# Patient Record
Sex: MALE | URBAN - METROPOLITAN AREA
[De-identification: names, ages, dates, MRNs, and addresses within clinical notes are randomized per-mention and may not be internally consistent; named-entity substitution may affect disease eponyms.]

---

## 2021-07-07 ENCOUNTER — OFFICE VISIT (OUTPATIENT)
Dept: URBAN - METROPOLITAN AREA CLINIC 88 | Facility: CLINIC | Age: 61
End: 2021-07-07
Payer: COMMERCIAL

## 2021-07-07 DIAGNOSIS — H43.393 OTHER VITREOUS OPACITIES, BILATERAL: ICD-10-CM

## 2021-07-07 DIAGNOSIS — H35.3110 NONEXUDATIVE AGE-RELATED MACULAR DEGENERATION OF RIGHT EYE: ICD-10-CM

## 2021-07-07 DIAGNOSIS — H25.13 AGE-RELATED NUCLEAR CATARACT, BILATERAL: ICD-10-CM

## 2021-07-07 PROCEDURE — 99204 OFFICE O/P NEW MOD 45 MIN: CPT | Performed by: OPTOMETRIST

## 2021-07-07 RX ORDER — DOXYCYCLINE 100 MG/1
100 MG TABLET, FILM COATED ORAL
Qty: 100 | Refills: 0 | Status: INACTIVE
Start: 2021-07-07 | End: 2022-01-14

## 2021-07-07 ASSESSMENT — KERATOMETRY
OS: 41.88
OD: 42.00

## 2021-07-07 ASSESSMENT — VISUAL ACUITY
OS: 20/30
OD: 20/40

## 2021-07-07 ASSESSMENT — INTRAOCULAR PRESSURE
OS: 13
OD: 14

## 2021-07-07 NOTE — IMPRESSION/PLAN
Impression: Rosacea conjunctivitis, bilateral: W70.526. Plan: Discussed status with patient. Recommend patient start Doxycycline 100mg BID PO X 1 week, then QD PO for remainder of pills for a total of 90 days. sent [facility] today. Side effects discussed. Recommend Systane or Refresh QID OU, if no improvement in symptoms start Systane gel TID to QID OU. ED on importance of continuous treatment of the chronic condition to prevent corneal scarring/ulceration in future.

## 2021-07-07 NOTE — IMPRESSION/PLAN
Impression: Nonexudative age-related macular degeneration of right eye: H35.3110. Plan: Discussed condition in detail with patient. Patient understands that a healthy diet and UV protection with polycarbonate lenses can reduce the progression of dry macular degeneration. Recommended AREDS 2 formula eye vitamins. Patient knows to return to clinic immediately if any changes in vision are experienced.

## 2021-07-07 NOTE — IMPRESSION/PLAN
Impression: Dry eye syndrome of bilateral lacrimal glands: H04.123.  Plan: See above plan for Rosacea

## 2021-10-06 ENCOUNTER — OFFICE VISIT (OUTPATIENT)
Dept: URBAN - METROPOLITAN AREA CLINIC 88 | Facility: CLINIC | Age: 61
End: 2021-10-06
Payer: COMMERCIAL

## 2021-10-06 DIAGNOSIS — H04.123 DRY EYE SYNDROME OF BILATERAL LACRIMAL GLANDS: ICD-10-CM

## 2021-10-06 DIAGNOSIS — B00.59 OTHER HERPESVIRAL DISEASE OF EYE: Primary | ICD-10-CM

## 2021-10-06 DIAGNOSIS — H10.823 ROSACEA CONJUNCTIVITIS, BILATERAL: ICD-10-CM

## 2021-10-06 PROCEDURE — 99214 OFFICE O/P EST MOD 30 MIN: CPT | Performed by: OPTOMETRIST

## 2021-10-06 RX ORDER — GANCICLOVIR 1.5 MG/G
0.15 % GEL OPHTHALMIC
Qty: 5 | Refills: 0 | Status: ACTIVE
Start: 2021-10-06

## 2021-10-06 ASSESSMENT — INTRAOCULAR PRESSURE
OS: 14
OD: 14

## 2021-10-06 NOTE — IMPRESSION/PLAN
Impression: Rosacea conjunctivitis, bilateral: V20.520. Plan: Discussed status with patient. Continue Doxycycline 100mg QD PO for remainder of pills, then d/c for 3 months. Continue Systane or Refresh QID OU, if no improvement in symptoms, start Systane gel TID to QID OU.

## 2021-10-06 NOTE — IMPRESSION/PLAN
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 Plan: Discussed status with patient. Small corneal herpetic ulcer OS today. Start Zirgan drops 5X a day until ulcer heals, then TID OS X 1 week after. (sent [facility] today) cost and side effects discussed. Follow up in 1 week with cornea check.

## 2021-10-13 ENCOUNTER — OFFICE VISIT (OUTPATIENT)
Dept: URBAN - METROPOLITAN AREA CLINIC 88 | Facility: CLINIC | Age: 61
End: 2021-10-13
Payer: COMMERCIAL

## 2021-10-13 DIAGNOSIS — H52.4 PRESBYOPIA: ICD-10-CM

## 2021-10-13 PROCEDURE — 99213 OFFICE O/P EST LOW 20 MIN: CPT | Performed by: OPTOMETRIST

## 2021-10-13 NOTE — IMPRESSION/PLAN
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 Plan: Discussed status with patient. Small corneal herpetic ulcer OS resolved today. Reduce Zirgan drops to 3X a day x 1 week, then d/c. Once completed Zirgan, use Systane gel TID in its place indefinitely. RTC immediately if increase in pain, redness or decrease in vision occurs.

## 2021-10-13 NOTE — IMPRESSION/PLAN
Impression: Rosacea conjunctivitis, bilateral: R04.875. Plan: Discussed status with patient. No Doxycycline at this time for 3 months. Continue Systane gel TID OU. Will start doxy again at next visit if needed.

## 2022-01-14 ENCOUNTER — OFFICE VISIT (OUTPATIENT)
Dept: URBAN - METROPOLITAN AREA CLINIC 88 | Facility: CLINIC | Age: 62
End: 2022-01-14
Payer: COMMERCIAL

## 2022-01-14 DIAGNOSIS — H52.221 REGULAR ASTIGMATISM, RIGHT EYE: ICD-10-CM

## 2022-01-14 PROCEDURE — 99213 OFFICE O/P EST LOW 20 MIN: CPT | Performed by: OPTOMETRIST

## 2022-01-14 RX ORDER — DOXYCYCLINE 100 MG/1
100 MG TABLET, FILM COATED ORAL
Qty: 100 | Refills: 0 | Status: ACTIVE
Start: 2022-01-14

## 2022-01-14 ASSESSMENT — VISUAL ACUITY
OD: 20/25
OS: 20/25

## 2022-01-14 ASSESSMENT — INTRAOCULAR PRESSURE
OD: 16
OS: 16

## 2022-01-14 NOTE — IMPRESSION/PLAN
Impression: Rosacea conjunctivitis, bilateral: D00.411. Plan: Discussed status with patient. Recommend patient restart Doxycycline 100mg BID PO X 1 week, then QD PO for remainder of pills for a total of 90 days. sent [facility] today. Side effects discussed. Recommend Systane or Refresh QID OU, if no improvement in symptoms start Systane gel TID to QID OU. ED on importance of continuous treatment of the chronic condition to prevent corneal scarring/ulceration in future.

## 2022-01-14 NOTE — IMPRESSION/PLAN
Impression: Regular astigmatism, right eye: H52.221. Plan: Reviewed refractive prescription in detail with patient and no need for glasses to improve distance vision at this time.  Ed pt the majority of his blur is from punctate keratitis/dry eye

## 2022-07-13 ENCOUNTER — OFFICE VISIT (OUTPATIENT)
Dept: URBAN - METROPOLITAN AREA CLINIC 88 | Facility: CLINIC | Age: 62
End: 2022-07-13
Payer: COMMERCIAL

## 2022-07-13 DIAGNOSIS — H10.823 ROSACEA CONJUNCTIVITIS, BILATERAL: Primary | ICD-10-CM

## 2022-07-13 DIAGNOSIS — H25.13 AGE-RELATED NUCLEAR CATARACT, BILATERAL: ICD-10-CM

## 2022-07-13 DIAGNOSIS — H43.393 OTHER VITREOUS OPACITIES, BILATERAL: ICD-10-CM

## 2022-07-13 DIAGNOSIS — H35.3110 NONEXUDATIVE AGE-RELATED MACULAR DEGENERATION OF RIGHT EYE: ICD-10-CM

## 2022-07-13 DIAGNOSIS — H04.123 DRY EYE SYNDROME OF BILATERAL LACRIMAL GLANDS: ICD-10-CM

## 2022-07-13 PROCEDURE — 99213 OFFICE O/P EST LOW 20 MIN: CPT | Performed by: OPTOMETRIST

## 2022-07-13 RX ORDER — DOXYCYCLINE 100 MG/1
100 MG TABLET, FILM COATED ORAL
Qty: 100 | Refills: 0 | Status: ACTIVE
Start: 2022-07-13

## 2022-07-13 ASSESSMENT — INTRAOCULAR PRESSURE
OS: 15
OD: 14

## 2022-07-13 NOTE — IMPRESSION/PLAN
Impression: Rosacea conjunctivitis, bilateral: T03.346. Plan: Discussed status with patient. Recommend preservative free Refresh plus QID OU and Continue Systane gel TID to QID OU. ED on importance of continuous treatment of the chronic condition to prevent corneal scarring/ulceration in future. Will refill 3 month supply of Doxycyline. (sent electronically today). Pt may wait to take doxy up to a month before starting next 3 month course.

## 2023-01-19 ENCOUNTER — OFFICE VISIT (OUTPATIENT)
Dept: URBAN - METROPOLITAN AREA CLINIC 88 | Facility: CLINIC | Age: 63
End: 2023-01-19
Payer: COMMERCIAL

## 2023-01-19 DIAGNOSIS — H10.823 ROSACEA CONJUNCTIVITIS, BILATERAL: Primary | ICD-10-CM

## 2023-01-19 DIAGNOSIS — H04.123 DRY EYE SYNDROME OF BILATERAL LACRIMAL GLANDS: ICD-10-CM

## 2023-01-19 DIAGNOSIS — H25.13 AGE-RELATED NUCLEAR CATARACT, BILATERAL: ICD-10-CM

## 2023-01-19 PROCEDURE — 99213 OFFICE O/P EST LOW 20 MIN: CPT | Performed by: OPTOMETRIST

## 2023-01-19 RX ORDER — VARENICLINE 0.03 MG/.05ML
SPRAY NASAL
Qty: 8.4 | Refills: 11 | Status: ACTIVE
Start: 2023-01-19

## 2023-01-19 NOTE — IMPRESSION/PLAN
Impression: Rosacea conjunctivitis, bilateral: F25.911. Plan: Discussed status with patient. Continue preservative free Refresh plus QID OU and Continue Systane gel TID to QID OU. ED on importance of continuous treatment of the chronic condition to prevent corneal scarring/ulceration in future. Sample of Tryvaya given today, and RX sent electronically. Continue Doxycyline. Recommend pt to also use warm compress QD-BID OU X 5-10 minutes.

## 2023-09-06 ENCOUNTER — OFFICE VISIT (OUTPATIENT)
Dept: URBAN - METROPOLITAN AREA CLINIC 88 | Facility: CLINIC | Age: 63
End: 2023-09-06
Payer: COMMERCIAL

## 2023-09-06 DIAGNOSIS — H43.393 OTHER VITREOUS OPACITIES, BILATERAL: ICD-10-CM

## 2023-09-06 DIAGNOSIS — H25.813 COMBINED FORMS OF AGE-RELATED CATARACT, BILATERAL: ICD-10-CM

## 2023-09-06 DIAGNOSIS — H04.123 DRY EYE SYNDROME OF BILATERAL LACRIMAL GLANDS: ICD-10-CM

## 2023-09-06 DIAGNOSIS — H10.823 ROSACEA CONJUNCTIVITIS, BILATERAL: Primary | ICD-10-CM

## 2023-09-06 PROCEDURE — 99213 OFFICE O/P EST LOW 20 MIN: CPT | Performed by: OPTOMETRIST

## 2023-09-06 ASSESSMENT — INTRAOCULAR PRESSURE
OS: 14
OD: 13

## 2024-09-11 ENCOUNTER — OFFICE VISIT (OUTPATIENT)
Dept: URBAN - METROPOLITAN AREA CLINIC 88 | Facility: CLINIC | Age: 64
End: 2024-09-11
Payer: COMMERCIAL

## 2024-09-11 DIAGNOSIS — H10.823 ROSACEA CONJUNCTIVITIS, BILATERAL: ICD-10-CM

## 2024-09-11 DIAGNOSIS — H25.813 COMBINED FORMS OF AGE-RELATED CATARACT, BILATERAL: Primary | ICD-10-CM

## 2024-09-11 DIAGNOSIS — H43.393 OTHER VITREOUS OPACITIES, BILATERAL: ICD-10-CM

## 2024-09-11 DIAGNOSIS — H04.123 DRY EYE SYNDROME OF BILATERAL LACRIMAL GLANDS: ICD-10-CM

## 2024-09-11 PROCEDURE — 92134 CPTRZ OPH DX IMG PST SGM RTA: CPT | Performed by: OPTOMETRIST

## 2024-09-11 PROCEDURE — 99214 OFFICE O/P EST MOD 30 MIN: CPT | Performed by: OPTOMETRIST

## 2024-09-11 ASSESSMENT — INTRAOCULAR PRESSURE
OD: 13
OS: 15

## 2024-09-11 ASSESSMENT — VISUAL ACUITY
OS: 20/70
OD: 20/40

## 2024-10-11 ENCOUNTER — TECH ONLY (OUTPATIENT)
Dept: URBAN - METROPOLITAN AREA CLINIC 88 | Facility: CLINIC | Age: 64
End: 2024-10-11
Payer: COMMERCIAL

## 2024-10-11 DIAGNOSIS — H25.813 COMBINED FORMS OF AGE-RELATED CATARACT, BILATERAL: Primary | ICD-10-CM

## 2024-10-21 ENCOUNTER — SURGERY (OUTPATIENT)
Dept: URBAN - METROPOLITAN AREA SURGERY 56 | Facility: SURGERY | Age: 64
End: 2024-10-21
Payer: COMMERCIAL

## 2024-10-21 PROCEDURE — 66984 XCAPSL CTRC RMVL W/O ECP: CPT | Performed by: OPHTHALMOLOGY

## 2024-10-21 PROCEDURE — PR3CP PR3CP: CUSTOM | Performed by: OPHTHALMOLOGY

## 2024-10-22 ENCOUNTER — SURGERY (OUTPATIENT)
Dept: URBAN - METROPOLITAN AREA SURGERY 56 | Facility: SURGERY | Age: 64
End: 2024-10-22
Payer: COMMERCIAL

## 2024-10-23 ENCOUNTER — POST-OPERATIVE VISIT (OUTPATIENT)
Dept: URBAN - METROPOLITAN AREA CLINIC 88 | Facility: CLINIC | Age: 64
End: 2024-10-23
Payer: COMMERCIAL

## 2024-10-23 DIAGNOSIS — Z48.810 ENCOUNTER FOR SURGICAL AFTERCARE FOLLOWING SURGERY ON A SENSE ORGAN: Primary | ICD-10-CM

## 2024-10-23 RX ORDER — PREDNISOLONE ACETATE 10 MG/ML
1 % SUSPENSION/ DROPS OPHTHALMIC
Qty: 10 | Refills: 1 | Status: ACTIVE
Start: 2024-10-23

## 2024-10-23 ASSESSMENT — INTRAOCULAR PRESSURE: OS: 11

## 2024-10-29 ENCOUNTER — SURGERY (OUTPATIENT)
Dept: URBAN - METROPOLITAN AREA SURGERY 56 | Facility: SURGERY | Age: 64
End: 2024-10-29
Payer: COMMERCIAL

## 2024-10-29 PROCEDURE — PR3CP PR3CP: CUSTOM | Performed by: OPHTHALMOLOGY

## 2024-10-29 PROCEDURE — 66984 XCAPSL CTRC RMVL W/O ECP: CPT | Performed by: OPHTHALMOLOGY

## 2024-10-30 ENCOUNTER — POST-OPERATIVE VISIT (OUTPATIENT)
Dept: URBAN - METROPOLITAN AREA CLINIC 88 | Facility: CLINIC | Age: 64
End: 2024-10-30
Payer: COMMERCIAL

## 2024-10-30 DIAGNOSIS — Z96.1 PRESENCE OF INTRAOCULAR LENS: Primary | ICD-10-CM

## 2024-10-30 ASSESSMENT — INTRAOCULAR PRESSURE
OD: 16
OS: 15

## 2024-10-30 ASSESSMENT — VISUAL ACUITY: OS: 20/25

## 2024-11-06 ENCOUNTER — POST-OPERATIVE VISIT (OUTPATIENT)
Dept: URBAN - METROPOLITAN AREA CLINIC 88 | Facility: CLINIC | Age: 64
End: 2024-11-06
Payer: COMMERCIAL

## 2024-11-06 DIAGNOSIS — Z96.1 PRESENCE OF INTRAOCULAR LENS: Primary | ICD-10-CM

## 2024-11-06 ASSESSMENT — INTRAOCULAR PRESSURE
OS: 13
OD: 13

## 2024-11-06 ASSESSMENT — VISUAL ACUITY
OD: 20/20
OS: 20/25

## 2024-12-11 ENCOUNTER — POST-OPERATIVE VISIT (OUTPATIENT)
Dept: URBAN - METROPOLITAN AREA CLINIC 88 | Facility: CLINIC | Age: 64
End: 2024-12-11
Payer: COMMERCIAL

## 2024-12-11 DIAGNOSIS — Z96.1 PRESENCE OF INTRAOCULAR LENS: Primary | ICD-10-CM

## 2024-12-11 ASSESSMENT — KERATOMETRY
OD: 41.00
OS: 41.75

## 2024-12-11 ASSESSMENT — INTRAOCULAR PRESSURE
OS: 11
OD: 13

## 2025-02-26 ENCOUNTER — OFFICE VISIT (OUTPATIENT)
Dept: URBAN - METROPOLITAN AREA CLINIC 88 | Facility: CLINIC | Age: 65
End: 2025-02-26
Payer: COMMERCIAL

## 2025-02-26 DIAGNOSIS — H43.393 OTHER VITREOUS OPACITIES, BILATERAL: ICD-10-CM

## 2025-02-26 DIAGNOSIS — Z96.1 PRESENCE OF PSEUDOPHAKIA: ICD-10-CM

## 2025-02-26 DIAGNOSIS — H26.491 OTHER SECONDARY CATARACT, RIGHT EYE: Primary | ICD-10-CM

## 2025-02-26 PROCEDURE — 99213 OFFICE O/P EST LOW 20 MIN: CPT | Performed by: OPTOMETRIST

## 2025-02-26 ASSESSMENT — INTRAOCULAR PRESSURE
OD: 9
OS: 9